# Patient Record
Sex: FEMALE | Race: WHITE | NOT HISPANIC OR LATINO
[De-identification: names, ages, dates, MRNs, and addresses within clinical notes are randomized per-mention and may not be internally consistent; named-entity substitution may affect disease eponyms.]

---

## 2017-12-11 PROBLEM — Z00.00 ENCOUNTER FOR PREVENTIVE HEALTH EXAMINATION: Status: ACTIVE | Noted: 2017-12-11

## 2017-12-21 ENCOUNTER — APPOINTMENT (OUTPATIENT)
Dept: UROGYNECOLOGY | Facility: CLINIC | Age: 52
End: 2017-12-21
Payer: COMMERCIAL

## 2017-12-21 VITALS
BODY MASS INDEX: 31.84 KG/M2 | DIASTOLIC BLOOD PRESSURE: 88 MMHG | WEIGHT: 215 LBS | SYSTOLIC BLOOD PRESSURE: 144 MMHG | HEIGHT: 69 IN | HEART RATE: 71 BPM

## 2017-12-21 DIAGNOSIS — Z78.9 OTHER SPECIFIED HEALTH STATUS: ICD-10-CM

## 2017-12-21 DIAGNOSIS — Z80.41 FAMILY HISTORY OF MALIGNANT NEOPLASM OF OVARY: ICD-10-CM

## 2017-12-21 DIAGNOSIS — J45.909 UNSPECIFIED ASTHMA, UNCOMPLICATED: ICD-10-CM

## 2017-12-21 DIAGNOSIS — Z82.5 FAMILY HISTORY OF ASTHMA AND OTHER CHRONIC LOWER RESPIRATORY DISEASES: ICD-10-CM

## 2017-12-21 DIAGNOSIS — Z80.0 FAMILY HISTORY OF MALIGNANT NEOPLASM OF DIGESTIVE ORGANS: ICD-10-CM

## 2017-12-21 DIAGNOSIS — M53.9 DORSOPATHY, UNSPECIFIED: ICD-10-CM

## 2017-12-21 LAB
BILIRUB UR QL STRIP: NORMAL
CLARITY UR: CLEAR
COLLECTION METHOD: NORMAL
GLUCOSE UR-MCNC: NORMAL
HCG UR QL: 0.2 EU/DL
HGB UR QL STRIP.AUTO: NORMAL
KETONES UR-MCNC: NORMAL
LEUKOCYTE ESTERASE UR QL STRIP: NORMAL
NITRITE UR QL STRIP: NORMAL
PH UR STRIP: 6
PROT UR STRIP-MCNC: NORMAL
SP GR UR STRIP: 1.01

## 2017-12-21 PROCEDURE — 51725 SIMPLE CYSTOMETROGRAM: CPT

## 2017-12-21 PROCEDURE — 99204 OFFICE O/P NEW MOD 45 MIN: CPT | Mod: 25

## 2017-12-21 RX ORDER — FLUTICASONE PROPIONATE AND SALMETEROL 50; 100 UG/1; UG/1
100-50 POWDER RESPIRATORY (INHALATION)
Qty: 60 | Refills: 0 | Status: ACTIVE | COMMUNITY
Start: 2017-08-02

## 2017-12-21 RX ORDER — MONTELUKAST 10 MG/1
10 TABLET, FILM COATED ORAL
Qty: 90 | Refills: 0 | Status: ACTIVE | COMMUNITY
Start: 2017-12-07

## 2017-12-31 ENCOUNTER — APPOINTMENT (OUTPATIENT)
Dept: MRI IMAGING | Facility: IMAGING CENTER | Age: 52
End: 2017-12-31
Payer: COMMERCIAL

## 2017-12-31 ENCOUNTER — OUTPATIENT (OUTPATIENT)
Dept: OUTPATIENT SERVICES | Facility: HOSPITAL | Age: 52
LOS: 1 days | End: 2017-12-31
Payer: COMMERCIAL

## 2017-12-31 DIAGNOSIS — N81.6 RECTOCELE: ICD-10-CM

## 2017-12-31 PROCEDURE — 72197 MRI PELVIS W/O & W/DYE: CPT

## 2017-12-31 PROCEDURE — 72197 MRI PELVIS W/O & W/DYE: CPT | Mod: 26

## 2018-01-09 ENCOUNTER — OUTPATIENT (OUTPATIENT)
Dept: OUTPATIENT SERVICES | Facility: HOSPITAL | Age: 53
LOS: 1 days | End: 2018-01-09
Payer: COMMERCIAL

## 2018-01-09 ENCOUNTER — APPOINTMENT (OUTPATIENT)
Dept: UROGYNECOLOGY | Facility: CLINIC | Age: 53
End: 2018-01-09
Payer: COMMERCIAL

## 2018-01-09 DIAGNOSIS — R39.15 URGENCY OF URINATION: ICD-10-CM

## 2018-01-09 DIAGNOSIS — N39.3 STRESS INCONTINENCE (FEMALE) (MALE): ICD-10-CM

## 2018-01-09 DIAGNOSIS — Z01.818 ENCOUNTER FOR OTHER PREPROCEDURAL EXAMINATION: ICD-10-CM

## 2018-01-09 DIAGNOSIS — R35.0 FREQUENCY OF MICTURITION: ICD-10-CM

## 2018-01-09 PROCEDURE — 51784 ANAL/URINARY MUSCLE STUDY: CPT | Mod: 26

## 2018-01-09 PROCEDURE — 51797 INTRAABDOMINAL PRESSURE TEST: CPT | Mod: 26

## 2018-01-09 PROCEDURE — 51784 ANAL/URINARY MUSCLE STUDY: CPT

## 2018-01-09 PROCEDURE — 51729 CYSTOMETROGRAM W/VP&UP: CPT | Mod: 26

## 2018-01-09 PROCEDURE — 51797 INTRAABDOMINAL PRESSURE TEST: CPT

## 2018-01-09 PROCEDURE — 51729 CYSTOMETROGRAM W/VP&UP: CPT

## 2018-01-10 ENCOUNTER — APPOINTMENT (OUTPATIENT)
Dept: COLORECTAL SURGERY | Facility: CLINIC | Age: 53
End: 2018-01-10
Payer: COMMERCIAL

## 2018-01-10 VITALS
OXYGEN SATURATION: 99 % | DIASTOLIC BLOOD PRESSURE: 85 MMHG | RESPIRATION RATE: 14 BRPM | HEART RATE: 79 BPM | BODY MASS INDEX: 30.78 KG/M2 | HEIGHT: 70 IN | SYSTOLIC BLOOD PRESSURE: 132 MMHG | WEIGHT: 215 LBS

## 2018-01-10 DIAGNOSIS — R15.9 FULL INCONTINENCE OF FECES: ICD-10-CM

## 2018-01-10 DIAGNOSIS — N39.3 STRESS INCONTINENCE (FEMALE) (MALE): ICD-10-CM

## 2018-01-10 DIAGNOSIS — Z78.9 OTHER SPECIFIED HEALTH STATUS: ICD-10-CM

## 2018-01-10 PROCEDURE — 99243 OFF/OP CNSLTJ NEW/EST LOW 30: CPT | Mod: 25

## 2018-01-10 PROCEDURE — 46600 DIAGNOSTIC ANOSCOPY SPX: CPT

## 2018-01-16 ENCOUNTER — OUTPATIENT (OUTPATIENT)
Dept: OUTPATIENT SERVICES | Facility: HOSPITAL | Age: 53
LOS: 1 days | End: 2018-01-16
Payer: COMMERCIAL

## 2018-01-16 ENCOUNTER — APPOINTMENT (OUTPATIENT)
Dept: UROGYNECOLOGY | Facility: CLINIC | Age: 53
End: 2018-01-16
Payer: COMMERCIAL

## 2018-01-16 DIAGNOSIS — Z01.818 ENCOUNTER FOR OTHER PREPROCEDURAL EXAMINATION: ICD-10-CM

## 2018-01-16 PROCEDURE — 52000 CYSTOURETHROSCOPY: CPT

## 2018-01-17 LAB
BILIRUB UR QL STRIP: NORMAL
CLARITY UR: CLEAR
COLLECTION METHOD: NORMAL
GLUCOSE UR-MCNC: NORMAL
HCG UR QL: 0.2 EU/DL
HGB UR QL STRIP.AUTO: NORMAL
KETONES UR-MCNC: NORMAL
LEUKOCYTE ESTERASE UR QL STRIP: NORMAL
NITRITE UR QL STRIP: NORMAL
PH UR STRIP: 6
PROT UR STRIP-MCNC: NORMAL
SP GR UR STRIP: 1

## 2018-01-19 ENCOUNTER — APPOINTMENT (OUTPATIENT)
Dept: COLORECTAL SURGERY | Facility: CLINIC | Age: 53
End: 2018-01-19
Payer: COMMERCIAL

## 2018-01-19 PROCEDURE — 91122 ANORECTAL MANOMETRY: CPT

## 2018-01-19 PROCEDURE — 76872 US TRANSRECTAL: CPT

## 2018-01-25 DIAGNOSIS — N30.00 ACUTE CYSTITIS WITHOUT HEMATURIA: ICD-10-CM

## 2018-02-27 ENCOUNTER — APPOINTMENT (OUTPATIENT)
Dept: UROGYNECOLOGY | Facility: CLINIC | Age: 53
End: 2018-02-27
Payer: COMMERCIAL

## 2018-02-27 PROCEDURE — 99214 OFFICE O/P EST MOD 30 MIN: CPT

## 2018-02-28 ENCOUNTER — OUTPATIENT (OUTPATIENT)
Dept: OUTPATIENT SERVICES | Facility: HOSPITAL | Age: 53
LOS: 1 days | End: 2018-02-28
Payer: COMMERCIAL

## 2018-02-28 VITALS
OXYGEN SATURATION: 100 % | SYSTOLIC BLOOD PRESSURE: 113 MMHG | TEMPERATURE: 98 F | HEIGHT: 70 IN | WEIGHT: 218.04 LBS | RESPIRATION RATE: 14 BRPM | DIASTOLIC BLOOD PRESSURE: 68 MMHG | HEART RATE: 70 BPM

## 2018-02-28 DIAGNOSIS — Z90.89 ACQUIRED ABSENCE OF OTHER ORGANS: Chronic | ICD-10-CM

## 2018-02-28 DIAGNOSIS — N81.2 INCOMPLETE UTEROVAGINAL PROLAPSE: ICD-10-CM

## 2018-02-28 DIAGNOSIS — Z98.51 TUBAL LIGATION STATUS: Chronic | ICD-10-CM

## 2018-02-28 DIAGNOSIS — Z98.890 OTHER SPECIFIED POSTPROCEDURAL STATES: Chronic | ICD-10-CM

## 2018-02-28 DIAGNOSIS — Z01.818 ENCOUNTER FOR OTHER PREPROCEDURAL EXAMINATION: ICD-10-CM

## 2018-02-28 DIAGNOSIS — N81.11 CYSTOCELE, MIDLINE: ICD-10-CM

## 2018-02-28 DIAGNOSIS — R15.1 FECAL SMEARING: ICD-10-CM

## 2018-02-28 DIAGNOSIS — J45.909 UNSPECIFIED ASTHMA, UNCOMPLICATED: ICD-10-CM

## 2018-02-28 DIAGNOSIS — N39.3 STRESS INCONTINENCE (FEMALE) (MALE): ICD-10-CM

## 2018-02-28 LAB
ANION GAP SERPL CALC-SCNC: 16 MMOL/L — SIGNIFICANT CHANGE UP (ref 5–17)
BLD GP AB SCN SERPL QL: NEGATIVE — SIGNIFICANT CHANGE UP
BUN SERPL-MCNC: 14 MG/DL — SIGNIFICANT CHANGE UP (ref 7–23)
CALCIUM SERPL-MCNC: 10.2 MG/DL — SIGNIFICANT CHANGE UP (ref 8.4–10.5)
CHLORIDE SERPL-SCNC: 99 MMOL/L — SIGNIFICANT CHANGE UP (ref 96–108)
CO2 SERPL-SCNC: 24 MMOL/L — SIGNIFICANT CHANGE UP (ref 22–31)
CREAT SERPL-MCNC: 0.71 MG/DL — SIGNIFICANT CHANGE UP (ref 0.5–1.3)
GLUCOSE SERPL-MCNC: 98 MG/DL — SIGNIFICANT CHANGE UP (ref 70–99)
HCT VFR BLD CALC: 39.2 % — SIGNIFICANT CHANGE UP (ref 34.5–45)
HGB BLD-MCNC: 12.7 G/DL — SIGNIFICANT CHANGE UP (ref 11.5–15.5)
MCHC RBC-ENTMCNC: 29.9 PG — SIGNIFICANT CHANGE UP (ref 27–34)
MCHC RBC-ENTMCNC: 32.4 GM/DL — SIGNIFICANT CHANGE UP (ref 32–36)
MCV RBC AUTO: 92.2 FL — SIGNIFICANT CHANGE UP (ref 80–100)
PLATELET # BLD AUTO: 355 K/UL — SIGNIFICANT CHANGE UP (ref 150–400)
POTASSIUM SERPL-MCNC: 4.4 MMOL/L — SIGNIFICANT CHANGE UP (ref 3.5–5.3)
POTASSIUM SERPL-SCNC: 4.4 MMOL/L — SIGNIFICANT CHANGE UP (ref 3.5–5.3)
RBC # BLD: 4.25 M/UL — SIGNIFICANT CHANGE UP (ref 3.8–5.2)
RBC # FLD: 13.8 % — SIGNIFICANT CHANGE UP (ref 10.3–14.5)
RH IG SCN BLD-IMP: POSITIVE — SIGNIFICANT CHANGE UP
SODIUM SERPL-SCNC: 139 MMOL/L — SIGNIFICANT CHANGE UP (ref 135–145)
WBC # BLD: 6.44 K/UL — SIGNIFICANT CHANGE UP (ref 3.8–10.5)
WBC # FLD AUTO: 6.44 K/UL — SIGNIFICANT CHANGE UP (ref 3.8–10.5)

## 2018-02-28 PROCEDURE — 80048 BASIC METABOLIC PNL TOTAL CA: CPT

## 2018-02-28 PROCEDURE — 85027 COMPLETE CBC AUTOMATED: CPT

## 2018-02-28 PROCEDURE — 86900 BLOOD TYPING SEROLOGIC ABO: CPT

## 2018-02-28 PROCEDURE — G0463: CPT

## 2018-02-28 PROCEDURE — 83036 HEMOGLOBIN GLYCOSYLATED A1C: CPT

## 2018-02-28 PROCEDURE — 86901 BLOOD TYPING SEROLOGIC RH(D): CPT

## 2018-02-28 PROCEDURE — 86850 RBC ANTIBODY SCREEN: CPT

## 2018-02-28 RX ORDER — SODIUM CHLORIDE 9 MG/ML
3 INJECTION INTRAMUSCULAR; INTRAVENOUS; SUBCUTANEOUS EVERY 8 HOURS
Qty: 0 | Refills: 0 | Status: DISCONTINUED | OUTPATIENT
Start: 2018-03-12 | End: 2018-03-12

## 2018-02-28 RX ORDER — FAMOTIDINE 10 MG/ML
20 INJECTION INTRAVENOUS ONCE
Qty: 0 | Refills: 0 | Status: COMPLETED | OUTPATIENT
Start: 2018-03-12 | End: 2018-03-12

## 2018-02-28 RX ORDER — CEFOTETAN DISODIUM 1 G
2 VIAL (EA) INJECTION ONCE
Qty: 0 | Refills: 0 | Status: DISCONTINUED | OUTPATIENT
Start: 2018-03-12 | End: 2018-03-14

## 2018-02-28 RX ORDER — LIDOCAINE HCL 20 MG/ML
0.2 VIAL (ML) INJECTION ONCE
Qty: 0 | Refills: 0 | Status: DISCONTINUED | OUTPATIENT
Start: 2018-03-12 | End: 2018-03-12

## 2018-02-28 NOTE — H&P PST ADULT - PMH
Asthma    Cystocele, midline    Fecal smearing    Gastroesophageal reflux disease without esophagitis    Urinary incontinence in female    Uterovaginal prolapse, incomplete

## 2018-02-28 NOTE — H&P PST ADULT - PSH
H/O tubal ligation  1990's  History of suburethral sling procedure    S/P D&C (status post dilation and curettage)    S/P rhinoplasty    S/P tonsillectomy

## 2018-02-28 NOTE — H&P PST ADULT - PROBLEM SELECTOR PLAN 1
rectopexy  midurethral sling  cystoscopy  anterior colporrhaphy  abdominal sacral colpopexy  supracervical hysterectomy  s/w surgical coordinator from Dr. Felipe's office- pt stated she had given a urine CNS sample on 2/27/2018- no results in allscript noted.  coordinator stated it takes a while for result to come back, a sample was sent.

## 2018-02-28 NOTE — H&P PST ADULT - HISTORY OF PRESENT ILLNESS
51 yo female. h/o asthma (well controlled), GERD, PSH of urethral lift. c/o urinary incontinence (leakage). evaluated by uro/gyn, UDS and MRI of pelvis revealed tricompartmental pelvic descent, cystocele and rectocele. presents to PST scheduled for rectopexy, anterior colporrhaphy, colpopexy and hysterectomy.

## 2018-02-28 NOTE — H&P PST ADULT - ANESTHESIA, PREVIOUS REACTION, PROFILE
"I had a D&C at [facility] in September 2017- after the procedure, my heart rate went down and that kept me in recovery for at least 2 hours.  I had other surgeries in the past and I never had issues before"

## 2018-02-28 NOTE — H&P PST ADULT - NSANTHOSAYNRD_GEN_A_CORE
No. WANDER screening performed.  STOP BANG Legend: 0-2 = LOW Risk; 3-4 = INTERMEDIATE Risk; 5-8 = HIGH Risk

## 2018-03-01 LAB
BACTERIA UR CULT: NORMAL
HBA1C BLD-MCNC: 5.7 % — HIGH (ref 4–5.6)

## 2018-03-12 ENCOUNTER — APPOINTMENT (OUTPATIENT)
Dept: COLORECTAL SURGERY | Facility: HOSPITAL | Age: 53
End: 2018-03-12
Payer: COMMERCIAL

## 2018-03-12 ENCOUNTER — APPOINTMENT (OUTPATIENT)
Dept: UROGYNECOLOGY | Facility: HOSPITAL | Age: 53
End: 2018-03-12
Payer: COMMERCIAL

## 2018-03-12 ENCOUNTER — INPATIENT (INPATIENT)
Facility: HOSPITAL | Age: 53
LOS: 1 days | Discharge: HOME | DRG: 743 | End: 2018-03-14
Attending: UROLOGY | Admitting: UROLOGY
Payer: COMMERCIAL

## 2018-03-12 ENCOUNTER — RESULT REVIEW (OUTPATIENT)
Age: 53
End: 2018-03-12

## 2018-03-12 ENCOUNTER — APPOINTMENT (OUTPATIENT)
Dept: COLORECTAL SURGERY | Facility: HOSPITAL | Age: 53
End: 2018-03-12

## 2018-03-12 ENCOUNTER — TRANSCRIPTION ENCOUNTER (OUTPATIENT)
Age: 53
End: 2018-03-12

## 2018-03-12 VITALS
TEMPERATURE: 98 F | WEIGHT: 218.04 LBS | OXYGEN SATURATION: 98 % | HEIGHT: 69 IN | DIASTOLIC BLOOD PRESSURE: 71 MMHG | RESPIRATION RATE: 16 BRPM | SYSTOLIC BLOOD PRESSURE: 105 MMHG | HEART RATE: 67 BPM

## 2018-03-12 DIAGNOSIS — R32 UNSPECIFIED URINARY INCONTINENCE: ICD-10-CM

## 2018-03-12 DIAGNOSIS — Z98.51 TUBAL LIGATION STATUS: Chronic | ICD-10-CM

## 2018-03-12 DIAGNOSIS — R15.1 FECAL SMEARING: ICD-10-CM

## 2018-03-12 DIAGNOSIS — N39.3 STRESS INCONTINENCE (FEMALE) (MALE): ICD-10-CM

## 2018-03-12 DIAGNOSIS — Z90.89 ACQUIRED ABSENCE OF OTHER ORGANS: Chronic | ICD-10-CM

## 2018-03-12 DIAGNOSIS — Z98.890 OTHER SPECIFIED POSTPROCEDURAL STATES: Chronic | ICD-10-CM

## 2018-03-12 LAB
GLUCOSE BLDC GLUCOMTR-MCNC: 95 MG/DL — SIGNIFICANT CHANGE UP (ref 70–99)
RH IG SCN BLD-IMP: POSITIVE — SIGNIFICANT CHANGE UP

## 2018-03-12 PROCEDURE — 45540 CORRECT RECTAL PROLAPSE: CPT | Mod: 62

## 2018-03-12 PROCEDURE — 57288 REPAIR BLADDER DEFECT: CPT

## 2018-03-12 PROCEDURE — 88305 TISSUE EXAM BY PATHOLOGIST: CPT | Mod: 26

## 2018-03-12 PROCEDURE — 88302 TISSUE EXAM BY PATHOLOGIST: CPT | Mod: 26

## 2018-03-12 PROCEDURE — 58180 PARTIAL HYSTERECTOMY: CPT

## 2018-03-12 PROCEDURE — 57280 SUSPENSION OF VAGINA: CPT

## 2018-03-12 PROCEDURE — 88307 TISSUE EXAM BY PATHOLOGIST: CPT | Mod: 26

## 2018-03-12 PROCEDURE — 57240 ANTERIOR COLPORRHAPHY: CPT

## 2018-03-12 RX ORDER — OXYCODONE HYDROCHLORIDE 5 MG/1
5 TABLET ORAL EVERY 4 HOURS
Qty: 0 | Refills: 0 | Status: DISCONTINUED | OUTPATIENT
Start: 2018-03-12 | End: 2018-03-14

## 2018-03-12 RX ORDER — PANTOPRAZOLE SODIUM 20 MG/1
40 TABLET, DELAYED RELEASE ORAL
Qty: 0 | Refills: 0 | Status: DISCONTINUED | OUTPATIENT
Start: 2018-03-12 | End: 2018-03-14

## 2018-03-12 RX ORDER — SODIUM CHLORIDE 9 MG/ML
1000 INJECTION, SOLUTION INTRAVENOUS
Qty: 0 | Refills: 0 | Status: DISCONTINUED | OUTPATIENT
Start: 2018-03-12 | End: 2018-03-13

## 2018-03-12 RX ORDER — HYDROMORPHONE HYDROCHLORIDE 2 MG/ML
0.5 INJECTION INTRAMUSCULAR; INTRAVENOUS; SUBCUTANEOUS
Qty: 0 | Refills: 0 | Status: DISCONTINUED | OUTPATIENT
Start: 2018-03-12 | End: 2018-03-12

## 2018-03-12 RX ORDER — MONTELUKAST 4 MG/1
10 TABLET, CHEWABLE ORAL DAILY
Qty: 0 | Refills: 0 | Status: DISCONTINUED | OUTPATIENT
Start: 2018-03-12 | End: 2018-03-14

## 2018-03-12 RX ORDER — DOCUSATE SODIUM 100 MG
100 CAPSULE ORAL
Qty: 0 | Refills: 0 | Status: DISCONTINUED | OUTPATIENT
Start: 2018-03-12 | End: 2018-03-14

## 2018-03-12 RX ORDER — ACETAMINOPHEN 500 MG
1000 TABLET ORAL EVERY 6 HOURS
Qty: 0 | Refills: 0 | Status: DISCONTINUED | OUTPATIENT
Start: 2018-03-12 | End: 2018-03-13

## 2018-03-12 RX ORDER — ALBUTEROL 90 UG/1
2 AEROSOL, METERED ORAL EVERY 6 HOURS
Qty: 0 | Refills: 0 | Status: DISCONTINUED | OUTPATIENT
Start: 2018-03-12 | End: 2018-03-13

## 2018-03-12 RX ORDER — ALBUTEROL 90 UG/1
2 AEROSOL, METERED ORAL EVERY 6 HOURS
Qty: 0 | Refills: 0 | Status: DISCONTINUED | OUTPATIENT
Start: 2018-03-12 | End: 2018-03-14

## 2018-03-12 RX ORDER — KETOROLAC TROMETHAMINE 30 MG/ML
30 SYRINGE (ML) INJECTION EVERY 6 HOURS
Qty: 0 | Refills: 0 | Status: DISCONTINUED | OUTPATIENT
Start: 2018-03-12 | End: 2018-03-13

## 2018-03-12 RX ORDER — ONDANSETRON 8 MG/1
4 TABLET, FILM COATED ORAL ONCE
Qty: 0 | Refills: 0 | Status: DISCONTINUED | OUTPATIENT
Start: 2018-03-12 | End: 2018-03-12

## 2018-03-12 RX ORDER — ONDANSETRON 8 MG/1
4 TABLET, FILM COATED ORAL EVERY 6 HOURS
Qty: 0 | Refills: 0 | Status: DISCONTINUED | OUTPATIENT
Start: 2018-03-12 | End: 2018-03-14

## 2018-03-12 RX ADMIN — SODIUM CHLORIDE 3 MILLILITER(S): 9 INJECTION INTRAMUSCULAR; INTRAVENOUS; SUBCUTANEOUS at 05:58

## 2018-03-12 RX ADMIN — Medication 30 MILLIGRAM(S): at 21:02

## 2018-03-12 RX ADMIN — Medication 100 MILLIGRAM(S): at 18:11

## 2018-03-12 RX ADMIN — Medication 30 MILLIGRAM(S): at 15:56

## 2018-03-12 RX ADMIN — OXYCODONE HYDROCHLORIDE 5 MILLIGRAM(S): 5 TABLET ORAL at 18:43

## 2018-03-12 RX ADMIN — Medication 30 MILLIGRAM(S): at 21:30

## 2018-03-12 RX ADMIN — OXYCODONE HYDROCHLORIDE 5 MILLIGRAM(S): 5 TABLET ORAL at 18:15

## 2018-03-12 RX ADMIN — FAMOTIDINE 20 MILLIGRAM(S): 10 INJECTION INTRAVENOUS at 05:47

## 2018-03-12 RX ADMIN — SODIUM CHLORIDE 75 MILLILITER(S): 9 INJECTION, SOLUTION INTRAVENOUS at 15:33

## 2018-03-12 RX ADMIN — Medication 1000 MILLIGRAM(S): at 18:41

## 2018-03-12 RX ADMIN — Medication 400 MILLIGRAM(S): at 18:11

## 2018-03-12 RX ADMIN — Medication 30 MILLIGRAM(S): at 15:25

## 2018-03-12 NOTE — BRIEF OPERATIVE NOTE - PROCEDURE
<<-----Click on this checkbox to enter Procedure Rectopexy for rectal prolapse  03/12/2018    Active  [email or web address]

## 2018-03-12 NOTE — BRIEF OPERATIVE NOTE - PROCEDURE
<<-----Click on this checkbox to enter Procedure Sacrocolpopexy by abdominal approach  03/12/2018    Active  [email or web address]  Supracervical hysterectomy with cystoscopy and sling procedure  03/12/2018  bilateral salpingectomy  Active  [email or web address]

## 2018-03-12 NOTE — BRIEF OPERATIVE NOTE - PRE-OP DX
Rectocele  03/12/2018    Active  Keon Sow
Female stress incontinence  03/12/2018    Active  Breana, Hawa  Rectocele  03/12/2018    Active  Keon Sow  Uterovaginal prolapse, incomplete  03/12/2018    Active  Breana, Hawa

## 2018-03-12 NOTE — BRIEF OPERATIVE NOTE - OPERATION/FINDINGS
Bladder adhesed to the lower uterine segment; evidence of prior bilateral tubal ligation; otherwise grossly normal abdominal survey; cystoscopy: normal bladder mucosa, no masses, no lesions, no evidence of perforation; normal bilateral ureteral orifices with brisk efflux of clear urine noted at the end of the procedure.

## 2018-03-12 NOTE — PATIENT PROFILE ADULT. - ANESTHESIA, PREVIOUS REACTION, PROFILE
"I had a D&C at [facility] in September 2017- after the procedure, my heart rate went down and that kept me in recovery for at least 2 hours.  I had other surgeries in the past and I never had issues before"

## 2018-03-12 NOTE — PROGRESS NOTE ADULT - PROBLEM SELECTOR PLAN 1
-Continue routine care  -Analgesia PRN  -OOB with assistance x 2, then Ad Shanika  -IVFluids- LR @ 75  -Diet-  Regular, Advance as Tolerated  - [place] to gravity      -PAS   -CBC & BMP in AM tomorrow  -For TOV in AM

## 2018-03-13 ENCOUNTER — TRANSCRIPTION ENCOUNTER (OUTPATIENT)
Age: 53
End: 2018-03-13

## 2018-03-13 LAB
ANION GAP SERPL CALC-SCNC: 12 MMOL/L — SIGNIFICANT CHANGE UP (ref 5–17)
BUN SERPL-MCNC: 9 MG/DL — SIGNIFICANT CHANGE UP (ref 7–23)
CALCIUM SERPL-MCNC: 8.5 MG/DL — SIGNIFICANT CHANGE UP (ref 8.4–10.5)
CHLORIDE SERPL-SCNC: 104 MMOL/L — SIGNIFICANT CHANGE UP (ref 96–108)
CO2 SERPL-SCNC: 22 MMOL/L — SIGNIFICANT CHANGE UP (ref 22–31)
CREAT SERPL-MCNC: 0.61 MG/DL — SIGNIFICANT CHANGE UP (ref 0.5–1.3)
CULTURE RESULTS: NO GROWTH — SIGNIFICANT CHANGE UP
GLUCOSE SERPL-MCNC: 111 MG/DL — HIGH (ref 70–99)
HCT VFR BLD CALC: 29 % — LOW (ref 34.5–45)
HCT VFR BLD CALC: 30.9 % — LOW (ref 34.5–45)
HCT VFR BLD CALC: 31.8 % — LOW (ref 34.5–45)
HGB BLD-MCNC: 10 G/DL — LOW (ref 11.5–15.5)
HGB BLD-MCNC: 10.5 G/DL — LOW (ref 11.5–15.5)
HGB BLD-MCNC: 11 G/DL — LOW (ref 11.5–15.5)
MCHC RBC-ENTMCNC: 30.8 PG — SIGNIFICANT CHANGE UP (ref 27–34)
MCHC RBC-ENTMCNC: 31.2 PG — SIGNIFICANT CHANGE UP (ref 27–34)
MCHC RBC-ENTMCNC: 31.5 PG — SIGNIFICANT CHANGE UP (ref 27–34)
MCHC RBC-ENTMCNC: 33.8 GM/DL — SIGNIFICANT CHANGE UP (ref 32–36)
MCHC RBC-ENTMCNC: 34.5 GM/DL — SIGNIFICANT CHANGE UP (ref 32–36)
MCHC RBC-ENTMCNC: 34.5 GM/DL — SIGNIFICANT CHANGE UP (ref 32–36)
MCV RBC AUTO: 90.5 FL — SIGNIFICANT CHANGE UP (ref 80–100)
MCV RBC AUTO: 91 FL — SIGNIFICANT CHANGE UP (ref 80–100)
MCV RBC AUTO: 91.2 FL — SIGNIFICANT CHANGE UP (ref 80–100)
NIGHT BLUE STAIN TISS: SIGNIFICANT CHANGE UP
PLATELET # BLD AUTO: 224 K/UL — SIGNIFICANT CHANGE UP (ref 150–400)
PLATELET # BLD AUTO: 237 K/UL — SIGNIFICANT CHANGE UP (ref 150–400)
PLATELET # BLD AUTO: 249 K/UL — SIGNIFICANT CHANGE UP (ref 150–400)
POTASSIUM SERPL-MCNC: 4 MMOL/L — SIGNIFICANT CHANGE UP (ref 3.5–5.3)
POTASSIUM SERPL-SCNC: 4 MMOL/L — SIGNIFICANT CHANGE UP (ref 3.5–5.3)
RBC # BLD: 3.17 M/UL — LOW (ref 3.8–5.2)
RBC # BLD: 3.4 M/UL — LOW (ref 3.8–5.2)
RBC # BLD: 3.51 M/UL — LOW (ref 3.8–5.2)
RBC # FLD: 11.4 % — SIGNIFICANT CHANGE UP (ref 10.3–14.5)
SODIUM SERPL-SCNC: 138 MMOL/L — SIGNIFICANT CHANGE UP (ref 135–145)
SPECIMEN SOURCE: SIGNIFICANT CHANGE UP
SPECIMEN SOURCE: SIGNIFICANT CHANGE UP
WBC # BLD: 10.2 K/UL — SIGNIFICANT CHANGE UP (ref 3.8–10.5)
WBC # BLD: 8.2 K/UL — SIGNIFICANT CHANGE UP (ref 3.8–10.5)
WBC # BLD: 8.8 K/UL — SIGNIFICANT CHANGE UP (ref 3.8–10.5)
WBC # FLD AUTO: 10.2 K/UL — SIGNIFICANT CHANGE UP (ref 3.8–10.5)
WBC # FLD AUTO: 8.2 K/UL — SIGNIFICANT CHANGE UP (ref 3.8–10.5)
WBC # FLD AUTO: 8.8 K/UL — SIGNIFICANT CHANGE UP (ref 3.8–10.5)

## 2018-03-13 RX ORDER — OXYCODONE HYDROCHLORIDE 5 MG/1
1 TABLET ORAL
Qty: 10 | Refills: 0 | OUTPATIENT
Start: 2018-03-13

## 2018-03-13 RX ORDER — FERROUS SULFATE 325(65) MG
325 TABLET ORAL THREE TIMES A DAY
Qty: 0 | Refills: 0 | Status: DISCONTINUED | OUTPATIENT
Start: 2018-03-13 | End: 2018-03-14

## 2018-03-13 RX ORDER — INFLUENZA VIRUS VACCINE 15; 15; 15; 15 UG/.5ML; UG/.5ML; UG/.5ML; UG/.5ML
0.5 SUSPENSION INTRAMUSCULAR ONCE
Qty: 0 | Refills: 0 | Status: DISCONTINUED | OUTPATIENT
Start: 2018-03-13 | End: 2018-03-14

## 2018-03-13 RX ORDER — IBUPROFEN 200 MG
1 TABLET ORAL
Qty: 0 | Refills: 0 | COMMUNITY
Start: 2018-03-13

## 2018-03-13 RX ORDER — PANTOPRAZOLE SODIUM 20 MG/1
1 TABLET, DELAYED RELEASE ORAL
Qty: 0 | Refills: 0 | COMMUNITY

## 2018-03-13 RX ORDER — ACETAMINOPHEN 500 MG
3 TABLET ORAL
Qty: 0 | Refills: 0 | COMMUNITY
Start: 2018-03-13

## 2018-03-13 RX ORDER — IBUPROFEN 200 MG
600 TABLET ORAL EVERY 6 HOURS
Qty: 0 | Refills: 0 | Status: DISCONTINUED | OUTPATIENT
Start: 2018-03-13 | End: 2018-03-14

## 2018-03-13 RX ORDER — ASCORBIC ACID 60 MG
250 TABLET,CHEWABLE ORAL
Qty: 0 | Refills: 0 | Status: DISCONTINUED | OUTPATIENT
Start: 2018-03-13 | End: 2018-03-13

## 2018-03-13 RX ORDER — ASCORBIC ACID 60 MG
250 TABLET,CHEWABLE ORAL THREE TIMES A DAY
Qty: 0 | Refills: 0 | Status: DISCONTINUED | OUTPATIENT
Start: 2018-03-13 | End: 2018-03-14

## 2018-03-13 RX ORDER — FLUTICASONE PROPIONATE AND SALMETEROL 50; 250 UG/1; UG/1
1 POWDER ORAL; RESPIRATORY (INHALATION)
Qty: 0 | Refills: 0 | COMMUNITY

## 2018-03-13 RX ORDER — BUDESONIDE AND FORMOTEROL FUMARATE DIHYDRATE 160; 4.5 UG/1; UG/1
2 AEROSOL RESPIRATORY (INHALATION)
Qty: 0 | Refills: 0 | Status: DISCONTINUED | OUTPATIENT
Start: 2018-03-13 | End: 2018-03-14

## 2018-03-13 RX ORDER — ALBUTEROL 90 UG/1
0 AEROSOL, METERED ORAL
Qty: 0 | Refills: 0 | COMMUNITY

## 2018-03-13 RX ORDER — ACETAMINOPHEN 500 MG
975 TABLET ORAL EVERY 6 HOURS
Qty: 0 | Refills: 0 | Status: DISCONTINUED | OUTPATIENT
Start: 2018-03-13 | End: 2018-03-14

## 2018-03-13 RX ORDER — MONTELUKAST 4 MG/1
1 TABLET, CHEWABLE ORAL
Qty: 0 | Refills: 0 | COMMUNITY

## 2018-03-13 RX ORDER — SODIUM CHLORIDE 9 MG/ML
1000 INJECTION, SOLUTION INTRAVENOUS
Qty: 0 | Refills: 0 | Status: DISCONTINUED | OUTPATIENT
Start: 2018-03-13 | End: 2018-03-13

## 2018-03-13 RX ORDER — SODIUM CHLORIDE 9 MG/ML
500 INJECTION, SOLUTION INTRAVENOUS ONCE
Qty: 0 | Refills: 0 | Status: COMPLETED | OUTPATIENT
Start: 2018-03-13 | End: 2018-03-13

## 2018-03-13 RX ORDER — SODIUM CHLORIDE 9 MG/ML
1000 INJECTION, SOLUTION INTRAVENOUS
Qty: 0 | Refills: 0 | Status: DISCONTINUED | OUTPATIENT
Start: 2018-03-13 | End: 2018-03-14

## 2018-03-13 RX ADMIN — PANTOPRAZOLE SODIUM 40 MILLIGRAM(S): 20 TABLET, DELAYED RELEASE ORAL at 05:36

## 2018-03-13 RX ADMIN — Medication 600 MILLIGRAM(S): at 11:40

## 2018-03-13 RX ADMIN — Medication 100 MILLIGRAM(S): at 05:36

## 2018-03-13 RX ADMIN — Medication 30 MILLIGRAM(S): at 02:19

## 2018-03-13 RX ADMIN — Medication 600 MILLIGRAM(S): at 12:35

## 2018-03-13 RX ADMIN — Medication 250 MILLIGRAM(S): at 22:44

## 2018-03-13 RX ADMIN — Medication 600 MILLIGRAM(S): at 18:22

## 2018-03-13 RX ADMIN — SODIUM CHLORIDE 1000 MILLILITER(S): 9 INJECTION, SOLUTION INTRAVENOUS at 17:03

## 2018-03-13 RX ADMIN — BUDESONIDE AND FORMOTEROL FUMARATE DIHYDRATE 2 PUFF(S): 160; 4.5 AEROSOL RESPIRATORY (INHALATION) at 16:00

## 2018-03-13 RX ADMIN — Medication 100 MILLIGRAM(S): at 18:23

## 2018-03-13 RX ADMIN — Medication 975 MILLIGRAM(S): at 17:00

## 2018-03-13 RX ADMIN — Medication 400 MILLIGRAM(S): at 05:36

## 2018-03-13 RX ADMIN — OXYCODONE HYDROCHLORIDE 5 MILLIGRAM(S): 5 TABLET ORAL at 08:29

## 2018-03-13 RX ADMIN — Medication 1000 MILLIGRAM(S): at 00:37

## 2018-03-13 RX ADMIN — Medication 400 MILLIGRAM(S): at 00:07

## 2018-03-13 RX ADMIN — Medication 975 MILLIGRAM(S): at 16:07

## 2018-03-13 RX ADMIN — MONTELUKAST 10 MILLIGRAM(S): 4 TABLET, CHEWABLE ORAL at 11:41

## 2018-03-13 RX ADMIN — OXYCODONE HYDROCHLORIDE 5 MILLIGRAM(S): 5 TABLET ORAL at 09:24

## 2018-03-13 RX ADMIN — Medication 30 MILLIGRAM(S): at 02:49

## 2018-03-13 RX ADMIN — Medication 325 MILLIGRAM(S): at 22:44

## 2018-03-13 NOTE — PROGRESS NOTE ADULT - ASSESSMENT
A/P: 51 yo s/p ex-lap, abdominal sacralcolpopexy, KERRI, BS, midurethral sling, cystoscopy and rectopexy. POD#1  Stable    Neuro: Pain well controlled with IV Toradol and tylenol, will advance to PO meds this AM  CV: Hemodynamically stable no tachycardia or hypotension  Pulm: O2 Sat wnl on RA, continue albuterol and singulair for tx of Asthma, continue incentive spirometry  GI: Reg  : UOP adequate via [place], plan for TOV this AM  Heme: SCDs for DVT ppx,   ID: Afebrile  Dispo: Routine post-op care    ELAN Mota, PGY3 A/P: 51 yo s/p ex-lap, abdominal sacralcolpopexy, KERRI, BS, midurethral sling, cystoscopy and rectopexy. POD#1  Stable    Neuro: Pain well controlled with IV Toradol and tylenol, will advance to PO meds this AM  CV: Hemodynamically stable no tachycardia or hypotension  Pulm: O2 Sat wnl on RA, continue albuterol and singulair for tx of Asthma, continue incentive spirometry  GI: Reg  : UOP adequate via [place], plan for TOV this AM  Heme: SCDs for DVT ppx,   ID: Afebrile  Dispo: Routine post-op care    ELAN Mota, PGY3    Urogyn Fellow Progress Note Addendum:  I examined the patient at bedside and agree with the above residents findings.   51 yo now POD#1 s/p ex-lap KERRI, BS, ASC, rectopexy, TVT sling and cystoscopy who is recovering well. Reports pain well controlled, tolerating PO, passing flatus this am. Vitals stable, Abd soft, NT; Incision c/d/i steristrips in place. No VB. +[place] with excellent UOP Hct 31.8 (39.2 pre-op with total EBL 500cc). Plan for repeat Hct at 10am then TOV. Likely for discharge to home today. Post-operative instructions reviewed with the patient. All questions answered.   Hawa Toussaint MD

## 2018-03-13 NOTE — PROGRESS NOTE ADULT - ATTENDING COMMENTS
+ flatus, dizziness with standing    aaox3  abd soft, tender at incision, incision c/d/i    H/H stable    s/p rectopexy  diet as shun  pain control  dizziness likely orthostatic

## 2018-03-13 NOTE — DISCHARGE NOTE ADULT - PLAN OF CARE
1.  Please follow up with Dr. Felipe in two weeks for your post-operative checkup.    2. Nothing in the vagina for 6 weeks including no tampons, no douching, no tub baths and no intercourse.  3. Please call the office or go to the Emergency Room if you have any of the following: fever over 100.4F, pain not controlled by oral pain medications, difficulty urinating, severe vaginal bleeding more than 1 pad per hour, or for any other concerns. Wellness

## 2018-03-13 NOTE — DISCHARGE NOTE ADULT - PATIENT PORTAL LINK FT
You can access the [employer] Patient Portal, offered by [facility], by registering with the following website: [email or web address]

## 2018-03-13 NOTE — DISCHARGE NOTE ADULT - MEDICATION SUMMARY - MEDICATIONS TO TAKE
I will START or STAY ON the medications listed below when I get home from the hospital:    ibuprofen 600 mg oral tablet  -- 1 tab(s) by mouth every 6 hours  -- Indication: For moderate pain    acetaminophen 325 mg oral tablet  -- 3 tab(s) by mouth every 6 hours, As needed, Mild Pain (1 - 3)  -- Indication: For mild pain I will START or STAY ON the medications listed below when I get home from the hospital:    ibuprofen 600 mg oral tablet  -- 1 tab(s) by mouth every 6 hours  -- Indication: For moderate pain    acetaminophen 325 mg oral tablet  -- 3 tab(s) by mouth every 6 hours, As needed, Mild Pain (1 - 3)  -- Indication: For mild pain    oxyCODONE 5 mg oral tablet  -- 1 tab(s) by mouth every 4 hours, As Needed -Severe Pain (7 - 10) - for severe pain MDD:5 tabs   -- Indication: For pain

## 2018-03-13 NOTE — PROGRESS NOTE ADULT - ASSESSMENT
Assessment/Plan:  52yFemale s/p Rectopexy for rectal prolapse by surgery and Sacrocolpopexy, Supracervical hysterectomy with cystoscopy and sling procedure, and bilateral salpingectomy with OB/GYN. Doing well post operatively.     -Care as per primary team   -Will continue to follow   -pain control  -PT  -WBAT  -OOB  -DVT ppx

## 2018-03-13 NOTE — PROGRESS NOTE ADULT - ASSESSMENT
51 yo now POD#1 s/p ex-lap KERRI, BS, ASC, rectopexy, TVT sling and cysto with post-operative anemia, symptomatic with ambulation.    -500cc bolus followed by IVF @ 150cc/hr    -Iron/Ascorbic Acid/Colace for anemia  -will repeat am cbc   -Continue routine post-op care   Hawa Toussaint MD

## 2018-03-13 NOTE — DISCHARGE NOTE ADULT - HOSPITAL COURSE
Pt underwent uncomplicated Ex Lap, KERRI, BS, abdominal sacrocolpopexy, rectopexy, MUS and cystoscopy on 3/12/18.  EBL: 500.  Hct: 39.2->31.8->30.9->29.0.  Postoperative course was complicated by orthostatic hypotension on POD1.  Pt passed TOV on POD2.     On the day of discharge the patient was stable and afebrile, voiding spontaneously, tolerating regular diet, ambulating at baseline and had pain well controlled with oral pain medications.  Patient to have close follow up with Dr. Felipe outpatient.

## 2018-03-13 NOTE — DISCHARGE NOTE ADULT - CARE PLAN
Principal Discharge DX:	Urinary incontinence in female  Goal:	Wellness  Assessment and plan of treatment:	1.  Please follow up with Dr. Felipe in two weeks for your post-operative checkup.    2. Nothing in the vagina for 6 weeks including no tampons, no douching, no tub baths and no intercourse.  3. Please call the office or go to the Emergency Room if you have any of the following: fever over 100.4F, pain not controlled by oral pain medications, difficulty urinating, severe vaginal bleeding more than 1 pad per hour, or for any other concerns.  Secondary Diagnosis:	Cystocele, midline  Secondary Diagnosis:	Uterovaginal prolapse, incomplete

## 2018-03-13 NOTE — DISCHARGE NOTE ADULT - CARE PROVIDER_API CALL
Huseyin Felipe (MD), Female Pelvic MedReconst Surg; Obstetrics and Gynecology  [address] [address]  Phone: [phone]  Fax: [phone]

## 2018-03-13 NOTE — CHART NOTE - NSCHARTNOTEFT_GEN_A_CORE
GYN PA     Pt is eating/drinking and feels well. Mild headache being treated w/ tylenol. She has been able to sit in chair without symptoms but has not attempted ambulation since 1pm.     Vital Signs Last 24 Hrs  T(C): 36.9 (13 Mar 2018 16:17), Max: 37.4 (13 Mar 2018 13:07)  T(F): 98.4 (13 Mar 2018 16:17), Max: 99.3 (13 Mar 2018 13:07)  HR: 68 (13 Mar 2018 16:17) (60 - 73)  BP: 123/76 (13 Mar 2018 16:17) (106/66 - 136/77)  RR: 18 (13 Mar 2018 16:17) (18 - 18)  SpO2: 98% (13 Mar 2018 16:17) (93% - 100%)                          10.0   8.2   )-----------( 224      ( 13 Mar 2018 14:50 )             29.0   baso x      eos x      imm gran x      lymph x      mono x      poly x                            10.5   8.8   )-----------( 237      ( 13 Mar 2018 09:53 )             30.9   baso x      eos x      imm gran x      lymph x      mono x      poly x                            11.0   10.2  )-----------( 249      ( 13 Mar 2018 05:41 )             31.8   baso x      eos x      imm gran x      lymph x      mono x      poly x            Plan:    -LR 500cc bolus now and increase maintenance fluids to 150cc/hr.   -Continue Ferrous, Vitamin C and Colace TID.  -CBC in AM.   -PAS while in bed and not ambulating.  -Continue [place] for now - trial of void tomorrow morning.  d/w Dr. Mota & Dr. Toussaint.  JASS Figueredo

## 2018-03-13 NOTE — CHART NOTE - NSCHARTNOTEFT_GEN_A_CORE
GYN POST-OP CHECK  AARON SANDERS    [id number]    No Known Drug Allergies  shellfish (Angioedema)  Tree Nuts (Angioedema; Other)      S:  Came to see Ms. Sanders due to dizziness when she attempted initial OOB with RN at 9:30am. Aaron reports she felt "fine" sitting but upon standing, felt "sweaty" and "dizzy." Symptoms resolved when she sat down. She experienced a similar episode last night accompanied by an episode of emesis. She denies current dizziness, visual changes, SOB, dyspnea, CP, palpitations, N/V, abdominal pain or leg pain.  She reports her incisional pain  as well controlled. She is tolerating a clear diet and had a roll for breakfast. She is passing flatus and has appropriate urine output.     O:   T(C): 36.9 (18 @ 09:00), Max: 36.9 (18 @ 09:00)  HR: 60 (18 @ 09:42) (60 - 73)  BP: 115/63 (18 @ 09:42) (115/63 - 115/63)  RR: 18 (18 @ 09:00) (18 - 18)  SpO2: 96% (18 @ 09:00) (96% - 96%)    Orthostatics:       Supine: 105/56     P73                           Sittin/68     P78                        Standin/57     P112    I&O's Summary    12 Mar 2018 07:  -  13 Mar 2018 07:00  --------------------------------------------------------  IN: 1605 mL / OUT: 2825 mL / NET: -1220 mL    13 Mar 2018 07:  -  13 Mar 2018 10:34  --------------------------------------------------------  IN: 240 mL / OUT: 0 mL / NET: 240 mL      Gen: NAD. A+Ox3.  CV: S1S2, RRR  Lungs: CTA B/L/ No wheezes/rales/rhonchi.  Abd: Abdominal binder in place.+BS. soft, gently distended and appropriately tender. Incision w/ steri's c/d/i. No ecchymoses. No rebound/guarding.  : Minimal vaginal spotting. [place] draining clear/jagdish urine.    Complete Blood Count (18 @ 09:53)    WBC Count: 8.8 K/uL    RBC Count: 3.40 M/uL    Hemoglobin: 10.5 g/dL    Hematocrit: 30.9 %    Mean Cell Volume: 91.0 fl    Mean Cell Hemoglobin: 30.8 pg    Mean Cell Hemoglobin Conc: 33.8 gm/dL    Red Cell Distrib Width: 11.4 %    Platelet Count - Automated: 237 K/uL    Complete Blood Count (18 @ 05:41)    WBC Count: 10.2 K/uL    RBC Count: 3.51 M/uL    Hemoglobin: 11.0 g/dL    Hematocrit: 31.8 %    Mean Cell Volume: 90.5 fl    Mean Cell Hemoglobin: 31.2 pg    Mean Cell Hemoglobin Conc: 34.5 gm/dL    Red Cell Distrib Width: 11.4 %    Platelet Count - Automated: 249 K/uL    Complete Blood Count (18 @ 13:50)    WBC Count: 6.44 K/uL    RBC Count: 4.25 M/uL    Hemoglobin: 12.7 g/dL    Hematocrit: 39.2 %    Mean Cell Volume: 92.2 fl    Mean Cell Hemoglobin: 29.9 pg    Mean Cell Hemoglobin Conc: 32.4 gm/dL    Red Cell Distrib Width: 13.8 %    Platelet Count - Automated: 355 K/uL      A/P: 52y Female POD#1 s/p ex-lap, abdominal sacralcolpopexy, KERRI, BS, midurethral sling, cystoscopy and rectopexy w/ EBL 500cc now with second episode of dizziness with standing and orthostatics positive likely due to anemia from blood loss vs. dehydration. Pt with stable VS, stable H/H and feels well symptomatically.  -Increase LR from 75 cc to 125cc. Encourage PO hydration and to eat lunch.   -Avoid narcotics before attempting to ambulate (oxycodone given 1 hr prior to patient's attempt to ambulate this AM.)  -Will attempt another OOB after lunch. If patient continues to be symptomatic or shows signs of anemia, would consider transfusion of 1u PRBC.   will d/w Urogyn team.   JASS Figueredo

## 2018-03-14 ENCOUNTER — TRANSCRIPTION ENCOUNTER (OUTPATIENT)
Age: 53
End: 2018-03-14

## 2018-03-14 VITALS
DIASTOLIC BLOOD PRESSURE: 70 MMHG | SYSTOLIC BLOOD PRESSURE: 114 MMHG | RESPIRATION RATE: 18 BRPM | HEART RATE: 68 BPM | OXYGEN SATURATION: 96 % | TEMPERATURE: 98 F

## 2018-03-14 LAB
HCT VFR BLD CALC: 29.3 % — LOW (ref 34.5–45)
HGB BLD-MCNC: 9.9 G/DL — LOW (ref 11.5–15.5)
MCHC RBC-ENTMCNC: 31.1 PG — SIGNIFICANT CHANGE UP (ref 27–34)
MCHC RBC-ENTMCNC: 33.9 GM/DL — SIGNIFICANT CHANGE UP (ref 32–36)
MCV RBC AUTO: 91.7 FL — SIGNIFICANT CHANGE UP (ref 80–100)
PLATELET # BLD AUTO: 249 K/UL — SIGNIFICANT CHANGE UP (ref 150–400)
RBC # BLD: 3.2 M/UL — LOW (ref 3.8–5.2)
RBC # FLD: 11.5 % — SIGNIFICANT CHANGE UP (ref 10.3–14.5)
WBC # BLD: 7.8 K/UL — SIGNIFICANT CHANGE UP (ref 3.8–10.5)
WBC # FLD AUTO: 7.8 K/UL — SIGNIFICANT CHANGE UP (ref 3.8–10.5)

## 2018-03-14 PROCEDURE — 88302 TISSUE EXAM BY PATHOLOGIST: CPT

## 2018-03-14 PROCEDURE — 88305 TISSUE EXAM BY PATHOLOGIST: CPT

## 2018-03-14 PROCEDURE — 85027 COMPLETE CBC AUTOMATED: CPT

## 2018-03-14 PROCEDURE — 82962 GLUCOSE BLOOD TEST: CPT

## 2018-03-14 PROCEDURE — 80048 BASIC METABOLIC PNL TOTAL CA: CPT

## 2018-03-14 PROCEDURE — 88307 TISSUE EXAM BY PATHOLOGIST: CPT

## 2018-03-14 PROCEDURE — 86900 BLOOD TYPING SEROLOGIC ABO: CPT

## 2018-03-14 PROCEDURE — 87206 SMEAR FLUORESCENT/ACID STAI: CPT

## 2018-03-14 PROCEDURE — C1781: CPT

## 2018-03-14 PROCEDURE — 94640 AIRWAY INHALATION TREATMENT: CPT

## 2018-03-14 PROCEDURE — C1771: CPT

## 2018-03-14 PROCEDURE — 87116 MYCOBACTERIA CULTURE: CPT

## 2018-03-14 PROCEDURE — 87086 URINE CULTURE/COLONY COUNT: CPT

## 2018-03-14 PROCEDURE — 86901 BLOOD TYPING SEROLOGIC RH(D): CPT

## 2018-03-14 RX ADMIN — PANTOPRAZOLE SODIUM 40 MILLIGRAM(S): 20 TABLET, DELAYED RELEASE ORAL at 05:01

## 2018-03-14 RX ADMIN — Medication 325 MILLIGRAM(S): at 05:01

## 2018-03-14 RX ADMIN — Medication 600 MILLIGRAM(S): at 00:55

## 2018-03-14 RX ADMIN — Medication 600 MILLIGRAM(S): at 00:25

## 2018-03-14 RX ADMIN — BUDESONIDE AND FORMOTEROL FUMARATE DIHYDRATE 2 PUFF(S): 160; 4.5 AEROSOL RESPIRATORY (INHALATION) at 08:41

## 2018-03-14 RX ADMIN — Medication 600 MILLIGRAM(S): at 05:01

## 2018-03-14 RX ADMIN — Medication 600 MILLIGRAM(S): at 12:18

## 2018-03-14 RX ADMIN — Medication 100 MILLIGRAM(S): at 05:01

## 2018-03-14 RX ADMIN — MONTELUKAST 10 MILLIGRAM(S): 4 TABLET, CHEWABLE ORAL at 12:18

## 2018-03-14 RX ADMIN — Medication 250 MILLIGRAM(S): at 05:01

## 2018-03-14 RX ADMIN — Medication 975 MILLIGRAM(S): at 08:44

## 2018-03-14 NOTE — CHART NOTE - NSCHARTNOTEFT_GEN_A_CORE
TOV Note    Active Trial of Void performed. Pt lying comfortably in bed.  300cc NS instilled into the bladder by gravity. Pt assisted to bathroom.  [place] D/C'd  Pt spontaneously voided  300  cc   [place]wood catheter  to remain out.            Janis Brar PAC

## 2018-03-14 NOTE — PROGRESS NOTE ADULT - ATTENDING COMMENTS
+ flatus, shun diet, no more dizziness    aaox3  abd soft, incision clean    s/p rectopexy    void trial  d/c home when OK with GYN  f/u with me next week as outpt

## 2018-03-14 NOTE — PROGRESS NOTE ADULT - ASSESSMENT
A/P: 51 yo s/p ex-lap, abdominal sacralcolpopexy, KERRI, BS, midurethral sling, cystoscopy and rectopexy. POD#2  Stable    Neuro: Pain well controlled with PO tylenol, motrin and oxycodone.  CV: Episodes of orthostatic hypotension yesterday, which resolved after increasing IV and PO hydration.  Currently hemodynamically stable no tachycardia or hypotension.   Pulm: O2 Sat wnl on RA, continue albuterol and singulair for tx of Asthma, continue incentive spirometry  GI: Reg  : UOP adequate via [place], plan for TOV this AM  Heme: SCDs for DVT ppx, H/H stable (12.7/39.2--->11/31.8->10.5/30.9->10/29-> 9.9/29.3), Will continue iron supplementation for anemia.   ID: Afebrile  Dispo: Routine post-op care    ELAN Mota, PGY3 A/P: 51 yo s/p ex-lap, abdominal sacralcolpopexy, KERRI, BS, midurethral sling, cystoscopy and rectopexy. POD#2  Stable    Neuro: Pain well controlled with PO tylenol, motrin and oxycodone.  CV: Episodes of orthostatic hypotension yesterday, which resolved after increasing IV and PO hydration.  Currently hemodynamically stable no tachycardia or hypotension.   Pulm: O2 Sat wnl on RA, continue albuterol and singulair for tx of Asthma, continue incentive spirometry  GI: Reg  : UOP adequate via [place], plan for TOV this AM  Heme: SCDs for DVT ppx, H/H stable (12.7/39.2--->11/31.8->10.5/30.9->10/29-> 9.9/29.3), Will continue iron supplementation for anemia.   ID: Afebrile  Dispo: Routine post-op care    ELAN Mota, PGY3    fellow note: patient seen and examined, agree with above, patient doing well feels much improvement from yesterday, OOB no dizziness, tolerating diet, passing flatus. VSS, labs stable. Plan for TOV, regular diet, PO medications and discharge planning, reviewed postoperative restrictions and precautions. All questions were answered.

## 2018-03-14 NOTE — PROGRESS NOTE ADULT - SUBJECTIVE AND OBJECTIVE BOX
R3 GYN Note:    Pt seen and examined at bedside.  No acute events overnight. Pt states that she was able to ambulate last night without feeling dizziness. Pain is well controlled. Pt is tolerating PO and passing flatus. Denies fever, chills, n/v, abdominal pain, vaginal bleeding, chest pain or SOB.    MEDICATIONS  (STANDING):  ascorbic acid 250 milliGRAM(s) Oral three times a day  buDESOnide  80 MICROgram(s)/formoterol 4.5 MICROgram(s) Inhaler 2 Puff(s) Inhalation two times a day  cefoTEtan  IVPB 2 Gram(s) IV Intermittent once  docusate sodium 100 milliGRAM(s) Oral two times a day  ferrous    sulfate 325 milliGRAM(s) Oral three times a day  ibuprofen  Tablet 600 milliGRAM(s) Oral every 6 hours  influenza   Vaccine 0.5 milliLiter(s) IntraMuscular once  lactated ringers. 1000 milliLiter(s) (150 mL/Hr) IV Continuous <Continuous>  montelukast 10 milliGRAM(s) Oral daily  pantoprazole    Tablet 40 milliGRAM(s) Oral before breakfast    MEDICATIONS  (PRN):  acetaminophen   Tablet. 975 milliGRAM(s) Oral every 6 hours PRN Mild Pain (1 - 3)  ALBUTerol    90 MICROgram(s) HFA Inhaler 2 Puff(s) Inhalation every 6 hours PRN Shortness of Breath and/or Wheezing  ondansetron Injectable 4 milliGRAM(s) IV Push every 6 hours PRN Nausea and/or Vomiting  oxyCODONE    IR 5 milliGRAM(s) Oral every 4 hours PRN Severe Pain (7 - 10)    Vital Signs Last 24 Hrs  T(C): 37 (14 Mar 2018 04:48), Max: 37.4 (13 Mar 2018 13:07)  T(F): 98.6 (14 Mar 2018 04:48), Max: 99.3 (13 Mar 2018 13:07)  HR: 65 (14 Mar 2018 04:48) (60 - 90)  BP: 133/81 (14 Mar 2018 04:48) (114/69 - 133/81)  BP(mean): --  RR: 18 (14 Mar 2018 04:48) (18 - 18)  SpO2: 96% (14 Mar 2018 04:48) (96% - 98%)    PHYSICAL EXAM:    GA: NAD, A+0 x 3  CV: RRR  Pulm: CTA BL  Abd: ( + ) BS, soft, nontender, nondistended, no rebound or guarding,   Incision: clean, dry and intact; steri-strips in place  : Scant lochia   [place]wood: clear urine  Extremities: no swelling or calf tenderness          LABS:                        9.9    7.8   )-----------( 249      ( 14 Mar 2018 05:56 )             29.3     03-13    138  |  104  |  9   ----------------------------<  111<H>  4.0   |  22  |  0.61    Ca    8.5      13 Mar 2018 05:41            RADIOLOGY & ADDITIONAL TESTS:
R3 GYN Note:    Pt seen and examined at bedside. Pt reports incisional soreness, pain otherwise well controlled. Pt also reports an episode of nausea and dizziness when attempting to ambulate. No other events overnight. Pt states that she feels well. She is tolerating PO. Pt not yet voiding, ambulating or passing flatus. Denies fever, chills, n/v, chest pain or SOB.    MEDICATIONS  (STANDING):  acetaminophen  IVPB. 1000 milliGRAM(s) IV Intermittent every 6 hours  cefoTEtan  IVPB 2 Gram(s) IV Intermittent once  docusate sodium 100 milliGRAM(s) Oral two times a day  ibuprofen  Tablet 600 milliGRAM(s) Oral every 6 hours  lactated ringers. 1000 milliLiter(s) (75 mL/Hr) IV Continuous <Continuous>  montelukast 10 milliGRAM(s) Oral daily  pantoprazole    Tablet 40 milliGRAM(s) Oral before breakfast    MEDICATIONS  (PRN):  acetaminophen   Tablet. 975 milliGRAM(s) Oral every 6 hours PRN Mild Pain (1 - 3)  ALBUTerol    90 MICROgram(s) HFA Inhaler 2 Puff(s) Inhalation every 6 hours PRN Shortness of Breath and/or Wheezing  ALBUTerol    90 MICROgram(s) HFA Inhaler 2 Puff(s) Inhalation every 6 hours PRN Shortness of Breath and/or Wheezing  ondansetron Injectable 4 milliGRAM(s) IV Push every 6 hours PRN Nausea and/or Vomiting  oxyCODONE    IR 5 milliGRAM(s) Oral every 4 hours PRN Severe Pain (7 - 10)      Vital Signs Last 24 Hrs  T(C): 37.1 (13 Mar 2018 05:22), Max: 37.3 (13 Mar 2018 01:15)  T(F): 98.8 (13 Mar 2018 05:22), Max: 99.1 (13 Mar 2018 01:15)  HR: 65 (13 Mar 2018 05:22) (64 - 73)  BP: 106/66 (13 Mar 2018 05:22) (104/59 - 136/77)  BP(mean): 89 (12 Mar 2018 15:45) (78 - 96)  RR: 18 (13 Mar 2018 05:22) (14 - 18)  SpO2: 94% (13 Mar 2018 05:22) (93% - 100%)      PHYSICAL EXAM:    GA: NAD, A+0 x 3  CV: RRR  Pulm: CTA BL  Abd:  soft, nontender, nondistended, no rebound or guarding,   Incision: clean, dry and intact; steri-strips in place  : amena WNL  [place]: Clear urine  Extremities: no swelling or calf tenderness        LABS:                        11.0   10.2  )-----------( 249      ( 13 Mar 2018 05:41 )             31.8     03-13    138  |  104  |  9   ----------------------------<  111<H>  4.0   |  22  |  0.61    Ca    8.5      13 Mar 2018 05:41            RADIOLOGY & ADDITIONAL TESTS:
Red Team Surgery Daily Progress Note     SUBJECTIVE:   ESMER o/n. Pt seen + examined. No specific complaints. Otherwise he denies nausea/vomiting, fever/chills, CP/SOB. Pain is well controlled.  No flatus.      VITALS  T(C): 36.9 (03-13-18 @ 09:00), Max: 37.3 (03-13-18 @ 01:15)  HR: 60 (03-13-18 @ 09:42) (60 - 73)  BP: 115/63 (03-13-18 @ 09:42) (104/59 - 136/77)  BP(mean): 89 (03-12-18 @ 15:45) (78 - 96)  RR: 18 (03-13-18 @ 09:00) (14 - 18)  SpO2: 96% (03-13-18 @ 09:00) (93% - 100%)  Wt(kg): --  CAPILLARY BLOOD GLUCOSE      Is/Os    03-12 @ 07:01  -  03-13 @ 07:00  --------------------------------------------------------  IN:    lactated ringers.: 1125 mL    Oral Fluid: 480 mL  Total IN: 1605 mL    OUT:    Indwelling Catheter - Urethral: 2825 mL  Total OUT: 2825 mL    Total NET: -1220 mL      03-13 @ 07:01  -  03-13 @ 10:24  --------------------------------------------------------  IN:    Oral Fluid: 240 mL  Total IN: 240 mL    OUT:  Total OUT: 0 mL    Total NET: 240 mL      PHYSICAL EXAM:   General: NAD, Lying in bed comfortably  GI/Abd: Soft, NT/ND, no rebound/guarding, no masses palpated. Dressings c/d/i.     MEDICATIONS (STANDING): cefoTEtan  IVPB 2 Gram(s) IV Intermittent once  docusate sodium 100 milliGRAM(s) Oral two times a day  ibuprofen  Tablet 600 milliGRAM(s) Oral every 6 hours  influenza   Vaccine 0.5 milliLiter(s) IntraMuscular once  lactated ringers. 1000 milliLiter(s) IV Continuous <Continuous>  montelukast 10 milliGRAM(s) Oral daily  pantoprazole    Tablet 40 milliGRAM(s) Oral before breakfast    MEDICATIONS (PRN):acetaminophen   Tablet. 975 milliGRAM(s) Oral every 6 hours PRN Mild Pain (1 - 3)  ALBUTerol    90 MICROgram(s) HFA Inhaler 2 Puff(s) Inhalation every 6 hours PRN Shortness of Breath and/or Wheezing  ALBUTerol    90 MICROgram(s) HFA Inhaler 2 Puff(s) Inhalation every 6 hours PRN Shortness of Breath and/or Wheezing  ondansetron Injectable 4 milliGRAM(s) IV Push every 6 hours PRN Nausea and/or Vomiting  oxyCODONE    IR 5 milliGRAM(s) Oral every 4 hours PRN Severe Pain (7 - 10)      LABS  CBC (03-13 @ 09:53)                              10.5<L>                         8.8     )----------------(  237        --    % Neutrophils, --    % Lymphocytes, ANC: --                                  30.9<L>  CBC (03-13 @ 05:41)                              11.0<L>                         10.2    )----------------(  249        --    % Neutrophils, --    % Lymphocytes, ANC: --                                  31.8<L>    BMP (03-13 @ 05:41)             138     |  104     |  9     		Ca++ --      Ca 8.5                ---------------------------------( 111<H>		Mg --                 4.0     |  22      |  0.61  			Ph --
Red Team Surgery Daily Progress Note     SUBJECTIVE:   ESMER o/n. Pt seen + examined. No specific complaints. Otherwise he denies nausea/vomiting, fever/chills, CP/SOB. Pain is well controlled.  Passing flatus.    VITALS  Vital Signs Last 24 Hrs  T(C): 37 (14 Mar 2018 04:48), Max: 37.4 (13 Mar 2018 13:07)  T(F): 98.6 (14 Mar 2018 04:48), Max: 99.3 (13 Mar 2018 13:07)  HR: 65 (14 Mar 2018 04:48) (60 - 90)  BP: 133/81 (14 Mar 2018 04:48) (114/69 - 133/81)  BP(mean): --  RR: 18 (14 Mar 2018 04:48) (18 - 18)  SpO2: 96% (14 Mar 2018 04:48) (96% - 98%)  CAPILLARY BLOOD GLUCOSE      I&O's Detail    13 Mar 2018 07:01  -  14 Mar 2018 07:00  --------------------------------------------------------  IN:    Lactated Ringers IV Bolus: 500 mL    lactated ringers.: 1800 mL    lactated ringers.: 875 mL    lactated ringers.: 150 mL    Oral Fluid: 980 mL  Total IN: 4305 mL    OUT:    Indwelling Catheter - Urethral: 3625 mL  Total OUT: 3625 mL    Total NET: 680 mL            PHYSICAL EXAM:   General: NAD, Lying in bed comfortably  GI/Abd: Soft, NT/ND, no rebound/guarding, no masses palpated. Dressings c/d/i.     MEDICATIONS (STANDING): cefoTEtan  IVPB 2 Gram(s) IV Intermittent once  docusate sodium 100 milliGRAM(s) Oral two times a day  ibuprofen  Tablet 600 milliGRAM(s) Oral every 6 hours  influenza   Vaccine 0.5 milliLiter(s) IntraMuscular once  lactated ringers. 1000 milliLiter(s) IV Continuous <Continuous>  montelukast 10 milliGRAM(s) Oral daily  pantoprazole    Tablet 40 milliGRAM(s) Oral before breakfast    MEDICATIONS (PRN):acetaminophen   Tablet. 975 milliGRAM(s) Oral every 6 hours PRN Mild Pain (1 - 3)  ALBUTerol    90 MICROgram(s) HFA Inhaler 2 Puff(s) Inhalation every 6 hours PRN Shortness of Breath and/or Wheezing  ALBUTerol    90 MICROgram(s) HFA Inhaler 2 Puff(s) Inhalation every 6 hours PRN Shortness of Breath and/or Wheezing  ondansetron Injectable 4 milliGRAM(s) IV Push every 6 hours PRN Nausea and/or Vomiting  oxyCODONE    IR 5 milliGRAM(s) Oral every 4 hours PRN Severe Pain (7 - 10)      LABS               9.9    7.8    )----------(  249       ( 14 Mar 2018 05:56 )               29.3                  CAPILLARY BLOOD GLUCOSE
Red Team Surgery POC    No acute events after surgery. Pain controlled.     PE:  Vital Signs Last 24 Hrs  T(C): 36.4 (12 Mar 2018 16:00), Max: 36.7 (12 Mar 2018 05:38)  T(F): 97.5 (12 Mar 2018 16:00), Max: 98.1 (12 Mar 2018 05:38)  HR: 68 (12 Mar 2018 16:30) (64 - 73)  BP: 136/70 (12 Mar 2018 16:30) (104/59 - 136/70)  BP(mean): 89 (12 Mar 2018 15:45) (78 - 96)  RR: 18 (12 Mar 2018 16:30) (14 - 18)  SpO2: 99% (12 Mar 2018 16:30) (97% - 100%)  Gen: No acute distress    Labs:                   Assessment/Plan:  52yFemale s/p Rectopexy for rectal prolapse by surgery and Sacrocolpopexy, Supracervical hysterectomy with cystoscopy and sling procedure, and bilateral salpingectomy with OB/GYN. Doing well post operatively.     -pain control  -PT  -WBAT  -OOB  -DVT ppx
Urogyn Progress Note Update- Patient seen and examined with family members (spouse and daughter) at bedside.   Pt unable to ambulate secondary to complaints of dizziness. +Orthostatics. Denies N/V, CP, palpitations, SOB. Incisional pain relieved with PO pain medication.   Reports similar episode of bradycardia and dizziness s/p D&C last year- per patient "does not react well to anesthesia" which usually goes away after a day or two.  Has no complaints at this time. Tolerating PO.    O: +orthostatics (see flowsheet)  Gen: NAD  Lungs: CTA b/l  Abd: abd binder in place- soft, NT, ND, +steristrips c/d/i  Pelvic: Min vag spotting +spain clear urine  Ext: NT b/l    Hct (pre-op) 39.2 --> POD#1 31.8 (@0541) --> 30.9 (@1000) -->  UOP >100cc/hr, clear urine
POST-OP CHECK    Allergies    No Known Drug Allergies  shellfish (Angioedema)  Tree Nuts (Angioedema; Other)      S: Pt sleeping, easily arousable  Pain controlled. Pt denies N/V, SOB, CP, palpitations.    O:   T(C): 36.4 (03-12-18 @ 16:00), Max: 36.4 (03-12-18 @ 13:50)  HR: 66 (03-12-18 @ 16:00) (64 - 73)  BP: 131/64 (03-12-18 @ 16:00) (104/59 - 134/69)  RR: 14 (03-12-18 @ 16:00) (14 - 14)  SpO2: 97% (03-12-18 @ 16:00) (97% - 100%)  Wt(kg): --  I&O's Summary    12 Mar 2018 07:01  -  12 Mar 2018 16:18  --------------------------------------------------------  IN: 150 mL / OUT: 425 mL / NET: -275 mL        CV: S1S2, RRR  Lungs: CTA B/L  Abd: soft, appropriately tender, occassional BS x 4 quadrants  Inc: pfannenstiel Clean/dry/intact  Ext: PAS in place, Neg Homans B/L    A/P: 52y Female S/P ex lap KERRI, BS, abdominal sacrocolpopexy, MUS, cysto, rectopexy. Doing well      PAST MEDICAL & SURGICAL HISTORY:  Fecal smearing  Cystocele, midline  Uterovaginal prolapse, incomplete  Urinary incontinence in female  Asthma  Gastroesophageal reflux disease without esophagitis  S/P D&C (status post dilation and curettage)  S/P tonsillectomy  S/P rhinoplasty  History of suburethral sling procedure  H/O tubal ligation: 1990&#x27;s      -Continue routine care  -Analgesia PRN  -OOB with assistance x 2, then Ad Shanika  -Meds:   acetaminophen  IVPB. 1000 milliGRAM(s) IV Intermittent every 6 hours  ALBUTerol    90 MICROgram(s) HFA Inhaler 2 Puff(s) Inhalation every 6 hours PRN  ALBUTerol    90 MICROgram(s) HFA Inhaler 2 Puff(s) Inhalation every 6 hours PRN  cefoTEtan  IVPB 2 Gram(s) IV Intermittent once  docusate sodium 100 milliGRAM(s) Oral two times a day  HYDROmorphone  Injectable 0.5 milliGRAM(s) IV Push every 10 minutes PRN  ketorolac   Injectable 30 milliGRAM(s) IV Push every 6 hours  lactated ringers. 1000 milliLiter(s) IV Continuous <Continuous>  montelukast 10 milliGRAM(s) Oral daily  ondansetron Injectable 4 milliGRAM(s) IV Push every 6 hours PRN  ondansetron Injectable 4 milliGRAM(s) IV Push once PRN  oxyCODONE    IR 5 milliGRAM(s) Oral every 4 hours PRN  pantoprazole    Tablet 40 milliGRAM(s) Oral before breakfast      -IVFluids- LR @ 75  -Diet-  Regular, Advance as Tolerated  - [place] to gravity      -PAS   -CBC & BMP in AM tomorrow  -For TOV in AM

## 2018-03-19 ENCOUNTER — MESSAGE (OUTPATIENT)
Age: 53
End: 2018-03-19

## 2018-03-19 ENCOUNTER — RESULT REVIEW (OUTPATIENT)
Age: 53
End: 2018-03-19

## 2018-03-19 LAB — SURGICAL PATHOLOGY STUDY: SIGNIFICANT CHANGE UP

## 2018-03-23 ENCOUNTER — APPOINTMENT (OUTPATIENT)
Dept: COLORECTAL SURGERY | Facility: CLINIC | Age: 53
End: 2018-03-23
Payer: COMMERCIAL

## 2018-03-23 VITALS
TEMPERATURE: 98.9 F | SYSTOLIC BLOOD PRESSURE: 126 MMHG | RESPIRATION RATE: 14 BRPM | OXYGEN SATURATION: 99 % | HEART RATE: 71 BPM | DIASTOLIC BLOOD PRESSURE: 83 MMHG

## 2018-03-23 PROCEDURE — 99024 POSTOP FOLLOW-UP VISIT: CPT

## 2018-03-28 LAB
CULTURE RESULTS: SIGNIFICANT CHANGE UP
SPECIMEN SOURCE: SIGNIFICANT CHANGE UP

## 2018-04-03 ENCOUNTER — APPOINTMENT (OUTPATIENT)
Dept: UROGYNECOLOGY | Facility: CLINIC | Age: 53
End: 2018-04-03
Payer: COMMERCIAL

## 2018-04-03 PROCEDURE — 99024 POSTOP FOLLOW-UP VISIT: CPT

## 2018-05-02 LAB
CULTURE RESULTS: SIGNIFICANT CHANGE UP
SPECIMEN SOURCE: SIGNIFICANT CHANGE UP

## 2018-05-17 ENCOUNTER — APPOINTMENT (OUTPATIENT)
Dept: UROGYNECOLOGY | Facility: CLINIC | Age: 53
End: 2018-05-17
Payer: COMMERCIAL

## 2018-05-17 DIAGNOSIS — Z98.890 OTHER SPECIFIED POSTPROCEDURAL STATES: ICD-10-CM

## 2018-05-17 PROCEDURE — 99024 POSTOP FOLLOW-UP VISIT: CPT

## 2018-06-13 ENCOUNTER — APPOINTMENT (OUTPATIENT)
Dept: COLORECTAL SURGERY | Facility: CLINIC | Age: 53
End: 2018-06-13
Payer: COMMERCIAL

## 2018-06-13 DIAGNOSIS — R15.1 FECAL SMEARING: ICD-10-CM

## 2018-06-13 DIAGNOSIS — N81.6 RECTOCELE: ICD-10-CM

## 2018-06-13 PROCEDURE — 99213 OFFICE O/P EST LOW 20 MIN: CPT

## 2018-06-13 RX ORDER — ALBUTEROL SULFATE 90 UG/1
108 (90 BASE) AEROSOL, METERED RESPIRATORY (INHALATION)
Qty: 9 | Refills: 0 | Status: ACTIVE | COMMUNITY
Start: 2018-03-12

## 2018-06-13 RX ORDER — PANTOPRAZOLE 40 MG/1
40 TABLET, DELAYED RELEASE ORAL
Qty: 30 | Refills: 0 | Status: DISCONTINUED | COMMUNITY
Start: 2016-11-16 | End: 2018-06-13

## 2018-06-13 RX ORDER — OXYCODONE 5 MG/1
5 TABLET ORAL
Qty: 10 | Refills: 0 | Status: DISCONTINUED | COMMUNITY
Start: 2018-03-13

## 2018-06-13 RX ORDER — GABAPENTIN 300 MG/1
300 CAPSULE ORAL
Qty: 90 | Refills: 0 | Status: DISCONTINUED | COMMUNITY
Start: 2018-01-03

## 2018-07-23 PROBLEM — Z80.0 FAMILY HISTORY OF COLON CANCER: Status: ACTIVE | Noted: 2017-12-21

## 2018-08-07 ENCOUNTER — APPOINTMENT (OUTPATIENT)
Dept: UROGYNECOLOGY | Facility: CLINIC | Age: 53
End: 2018-08-07

## 2018-11-12 ENCOUNTER — MESSAGE (OUTPATIENT)
Age: 53
End: 2018-11-12

## 2019-01-16 NOTE — BRIEF OPERATIVE NOTE - ASSISTANT(S)
Patient notified via voicemail to get scheduled prior to med refills  
Was the patient seen in the last year in this department? Yes    Does patient have an active prescription for medications requested? Yes    Received Request Via: Pharmacy     Last Visit: 4/30/18  Last Lab: 2/8/18  
ELAN Sow MD
Hawa Toussaint-Fellow; Tonia Mota-[id number]

## 2020-02-17 ENCOUNTER — TRANSCRIPTION ENCOUNTER (OUTPATIENT)
Age: 55
End: 2020-02-17

## 2021-02-21 ENCOUNTER — TRANSCRIPTION ENCOUNTER (OUTPATIENT)
Age: 56
End: 2021-02-21

## 2021-07-30 ENCOUNTER — TRANSCRIPTION ENCOUNTER (OUTPATIENT)
Age: 56
End: 2021-07-30

## 2022-10-20 ENCOUNTER — NON-APPOINTMENT (OUTPATIENT)
Age: 57
End: 2022-10-20

## 2023-06-23 ENCOUNTER — NON-APPOINTMENT (OUTPATIENT)
Age: 58
End: 2023-06-23

## 2024-01-07 ENCOUNTER — NON-APPOINTMENT (OUTPATIENT)
Age: 59
End: 2024-01-07

## 2024-02-22 ENCOUNTER — NON-APPOINTMENT (OUTPATIENT)
Age: 59
End: 2024-02-22

## 2024-03-04 ENCOUNTER — NON-APPOINTMENT (OUTPATIENT)
Age: 59
End: 2024-03-04

## 2024-07-03 NOTE — H&P PST ADULT - TOBACCO USE
7/3/24  Mercy Langston : Never smoker

## 2024-09-25 ENCOUNTER — NON-APPOINTMENT (OUTPATIENT)
Age: 59
End: 2024-09-25

## 2024-09-29 ENCOUNTER — NON-APPOINTMENT (OUTPATIENT)
Age: 59
End: 2024-09-29